# Patient Record
Sex: FEMALE | Race: WHITE | Employment: FULL TIME | URBAN - METROPOLITAN AREA
[De-identification: names, ages, dates, MRNs, and addresses within clinical notes are randomized per-mention and may not be internally consistent; named-entity substitution may affect disease eponyms.]

---

## 2017-11-10 ENCOUNTER — HOSPITAL ENCOUNTER (EMERGENCY)
Age: 57
Discharge: HOME | End: 2017-11-10
Attending: EMERGENCY MEDICINE
Payer: SELF-PAY

## 2017-11-10 ENCOUNTER — APPOINTMENT (OUTPATIENT)
Dept: GENERAL RADIOLOGY | Age: 57
End: 2017-11-10
Attending: EMERGENCY MEDICINE
Payer: SELF-PAY

## 2017-11-10 VITALS
RESPIRATION RATE: 16 BRPM | TEMPERATURE: 97.7 F | OXYGEN SATURATION: 95 % | SYSTOLIC BLOOD PRESSURE: 130 MMHG | HEART RATE: 57 BPM | DIASTOLIC BLOOD PRESSURE: 82 MMHG

## 2017-11-10 DIAGNOSIS — M25.561 ACUTE PAIN OF RIGHT KNEE: Primary | ICD-10-CM

## 2017-11-10 LAB
INR PPP: 1.1 (ref 0.9–1.2)
PROTHROMBIN TIME: 12.1 SEC (ref 9.6–12)

## 2017-11-10 PROCEDURE — 73562 X-RAY EXAM OF KNEE 3: CPT

## 2017-11-10 PROCEDURE — 99282 EMERGENCY DEPT VISIT SF MDM: CPT | Performed by: EMERGENCY MEDICINE

## 2017-11-10 PROCEDURE — 85610 PROTHROMBIN TIME: CPT | Performed by: EMERGENCY MEDICINE

## 2017-11-10 NOTE — ED NOTES
I have reviewed discharge instructions with the patient. The patient verbalized understanding. Patient left ED via Discharge Method: wheelchair to Home with family. Opportunity for questions and clarification provided. Patient given 0 scripts.

## 2017-11-10 NOTE — DISCHARGE INSTRUCTIONS
Knee Pain or Injury: Care Instructions  Your Care Instructions    Injuries are a common cause of knee problems. Sudden (acute) injuries may be caused by a direct blow to the knee. They can also be caused by abnormal twisting, bending, or falling on the knee. Pain, bruising, or swelling may be severe, and may start within minutes of the injury. Overuse is another cause of knee pain. Other causes are climbing stairs, kneeling, and other activities that use the knee. Everyday wear and tear, especially as you get older, also can cause knee pain. Rest, along with home treatment, often relieves pain and allows your knee to heal. If you have a serious knee injury, you may need tests and treatment. Follow-up care is a key part of your treatment and safety. Be sure to make and go to all appointments, and call your doctor if you are having problems. It's also a good idea to know your test results and keep a list of the medicines you take. How can you care for yourself at home? · Be safe with medicines. Read and follow all instructions on the label. ¨ If the doctor gave you a prescription medicine for pain, take it as prescribed. ¨ If you are not taking a prescription pain medicine, ask your doctor if you can take an over-the-counter medicine. · Rest and protect your knee. Take a break from any activity that may cause pain. · Put ice or a cold pack on your knee for 10 to 20 minutes at a time. Put a thin cloth between the ice and your skin. · Prop up a sore knee on a pillow when you ice it or anytime you sit or lie down for the next 3 days. Try to keep it above the level of your heart. This will help reduce swelling. · If your knee is not swollen, you can put moist heat, a heating pad, or a warm cloth on your knee. · If your doctor recommends an elastic bandage, sleeve, or other type of support for your knee, wear it as directed.   · Follow your doctor's instructions about how much weight you can put on your leg. Use a cane, crutches, or a walker as instructed. · Follow your doctor's instructions about activity during your healing process. If you can do mild exercise, slowly increase your activity. · Reach and stay at a healthy weight. Extra weight can strain the joints, especially the knees and hips, and make the pain worse. Losing even a few pounds may help. When should you call for help? Call 911 anytime you think you may need emergency care. For example, call if:  ? · You have symptoms of a blood clot in your lung (called a pulmonary embolism). These may include:  ¨ Sudden chest pain. ¨ Trouble breathing. ¨ Coughing up blood. ?Call your doctor now or seek immediate medical care if:  ? · You have severe or increasing pain. ? · Your leg or foot turns cold or changes color. ? · You cannot stand or put weight on your knee. ? · Your knee looks twisted or bent out of shape. ? · You cannot move your knee. ? · You have signs of infection, such as:  ¨ Increased pain, swelling, warmth, or redness. ¨ Red streaks leading from the knee. ¨ Pus draining from a place on your knee. ¨ A fever. ? · You have signs of a blood clot in your leg (called a deep vein thrombosis), such as:  ¨ Pain in your calf, back of the knee, thigh, or groin. ¨ Redness and swelling in your leg or groin. ? Watch closely for changes in your health, and be sure to contact your doctor if:  ? · You have tingling, weakness, or numbness in your knee. ? · You have any new symptoms, such as swelling. ? · You have bruises from a knee injury that last longer than 2 weeks. ? · You do not get better as expected. Where can you learn more? Go to http://edilia-jadiel.info/. Enter K195 in the search box to learn more about \"Knee Pain or Injury: Care Instructions. \"  Current as of: March 20, 2017  Content Version: 11.4  © 7603-0714 [employer].  Care instructions adapted under license by [employer] (which disclaims liability or warranty for this information). If you have questions about a medical condition or this instruction, always ask your healthcare professional. [address] any warranty or liability for your use of this information.

## 2017-11-10 NOTE — ED TRIAGE NOTES
Pt fell at home prior to coming to work today. 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

## 2017-11-10 NOTE — ED PROVIDER NOTES
[employer]                        Emergency Department     Chief Complaint:  Right knee pain  HPI:  Patient fell this morning tripped over the covers landing directly on her right knee she did not hit her head has no other complaints she does take Coumadin for past history of blood clots. Mechanism of injury ttrip and fall  Pain was first noticed this morning  Severity of pain is moderate improving  Associated symptoms include pain with palpation or ambulation  Onset of symptoms was sudden  Historian:   patient  Review of Systems:  Include pertinent positives and negatives. CONST:          Denies: fever  MUSC: Right knee pain no other injuries\  Head:  nontraumatic  SKIN:  Denies: rash  Past Medical History:  No past medical history on file. No past surgical history on file. Social History   Substance Use Topics    Smoking status: Not on file    Smokeless tobacco: Not on file    Alcohol use Not on file     No family history on file. Previous Medications    No medications on file     Allergies as of 11/10/2017 - Review Complete 11/10/2017   Allergen Reaction Noted    Penicillins Rash 11/10/2017     . immunhx    Physical Exam:      General Appear: alert, no distress  Musculoskeletal:  The patient has knee swelling and pain anteriorly with palpation. No instability noted. Pulses:  ppulses and cap refill intact distally to injury.   Skin:   no rash  Neurologic:  no sensory or motor deficit  _______________________________________________________________________  Radiology studies performed:    XR KNEE RT 3 V   Final Result   IMPRESSION: No acute bony abnormality          _______________________________________________________________________  Progress:    Patient feeling better on reevaluation    Nursing notes were reviewed:  yes  Old medical records: obtained and reviewed:  No  Discussed patient with another provider: No  _______________________________________________________________________  Assessment/Plan:    Patient's x-ray is normal she'll follow up with orthopedics if symptoms do not improve.    _______________________________________________________________________  Condition:  Good  Disposition:  Home  Diagnosis:  Knee injury      MARIA TERESA Trejo; version 2.0; revised April, 2016

## 2018-01-31 PROBLEM — I82.409 DVT (DEEP VENOUS THROMBOSIS) (HCC): Status: ACTIVE | Noted: 2017-06-01

## 2018-01-31 PROBLEM — I26.99 PULMONARY EMBOLI (HCC): Status: ACTIVE | Noted: 2017-06-01

## 2018-02-12 PROBLEM — Z80.3 FAMILY HISTORY OF BREAST CANCER IN MOTHER: Status: ACTIVE | Noted: 2018-02-12

## 2018-02-12 PROBLEM — Z12.11 COLON CANCER SCREENING: Status: ACTIVE | Noted: 2018-02-12

## 2018-02-12 PROBLEM — E78.00 ELEVATED CHOLESTEROL: Status: ACTIVE | Noted: 2018-02-12

## 2018-02-12 PROBLEM — R63.5 WEIGHT GAIN: Status: ACTIVE | Noted: 2018-02-12

## 2018-02-12 PROBLEM — R00.2 PALPITATIONS: Status: ACTIVE | Noted: 2018-02-12

## 2018-02-12 PROBLEM — G47.39 OTHER SLEEP APNEA: Status: ACTIVE | Noted: 2018-02-12

## 2018-02-12 PROBLEM — R06.02 SOB (SHORTNESS OF BREATH): Status: ACTIVE | Noted: 2018-02-12

## 2018-02-19 ENCOUNTER — HOSPITAL ENCOUNTER (OUTPATIENT)
Dept: MAMMOGRAPHY | Age: 58
Discharge: HOME | End: 2018-02-19
Attending: INTERNAL MEDICINE
Payer: COMMERCIAL

## 2018-02-19 DIAGNOSIS — Z80.3 FAMILY HISTORY OF BREAST CANCER IN MOTHER: ICD-10-CM

## 2018-02-19 PROCEDURE — 77067 SCR MAMMO BI INCL CAD: CPT

## 2018-02-21 ENCOUNTER — HOSPITAL ENCOUNTER (OUTPATIENT)
Dept: ULTRASOUND IMAGING | Age: 58
Discharge: HOME | End: 2018-02-21
Attending: INTERNAL MEDICINE
Payer: COMMERCIAL

## 2018-02-21 ENCOUNTER — HOSPITAL ENCOUNTER (OUTPATIENT)
Dept: CT IMAGING | Age: 58
Discharge: HOME | End: 2018-02-21
Attending: INTERNAL MEDICINE
Payer: COMMERCIAL

## 2018-02-21 DIAGNOSIS — I82.493 DEEP VEIN THROMBOSIS (DVT) OF OTHER VEIN OF BOTH LOWER EXTREMITIES, UNSPECIFIED CHRONICITY (HCC): ICD-10-CM

## 2018-02-21 DIAGNOSIS — R06.02 SOB (SHORTNESS OF BREATH): ICD-10-CM

## 2018-02-21 DIAGNOSIS — Z86.711 HISTORY OF PULMONARY EMBOLISM: ICD-10-CM

## 2018-02-21 LAB — CREAT BLD-MCNC: 0.9 MG/DL (ref 0.8–1.5)

## 2018-02-21 PROCEDURE — 74011000258 HC RX REV CODE- 258: Performed by: INTERNAL MEDICINE

## 2018-02-21 PROCEDURE — 74011636320 HC RX REV CODE- 636/320: Performed by: INTERNAL MEDICINE

## 2018-02-21 PROCEDURE — 93970 EXTREMITY STUDY: CPT

## 2018-02-21 PROCEDURE — 71260 CT THORAX DX C+: CPT

## 2018-02-21 PROCEDURE — 82565 ASSAY OF CREATININE: CPT

## 2018-02-21 RX ORDER — SODIUM CHLORIDE 0.9 % (FLUSH) 0.9 %
10 SYRINGE (ML) INJECTION
Status: COMPLETED | OUTPATIENT
Start: 2018-02-21 | End: 2018-02-21

## 2018-02-21 RX ADMIN — IOPAMIDOL 100 ML: 755 INJECTION, SOLUTION INTRAVENOUS at 10:05

## 2018-02-21 RX ADMIN — Medication 10 ML: at 10:06

## 2018-02-21 RX ADMIN — SODIUM CHLORIDE 100 ML: 900 INJECTION, SOLUTION INTRAVENOUS at 10:05

## 2018-03-20 ENCOUNTER — HOSPITAL ENCOUNTER (OUTPATIENT)
Dept: ULTRASOUND IMAGING | Age: 58
Discharge: HOME | End: 2018-03-20
Attending: INTERNAL MEDICINE
Payer: COMMERCIAL

## 2018-03-20 DIAGNOSIS — E04.1 THYROID NODULE: ICD-10-CM

## 2018-03-20 PROCEDURE — 76536 US EXAM OF HEAD AND NECK: CPT

## 2018-05-01 PROBLEM — Z79.01 LONG TERM (CURRENT) USE OF ANTICOAGULANTS: Status: ACTIVE | Noted: 2018-05-01

## 2018-05-01 PROBLEM — I26.99 PULMONARY EMBOLUS (HCC): Status: ACTIVE | Noted: 2018-05-01

## 2018-05-14 PROBLEM — R06.02 SOB (SHORTNESS OF BREATH): Status: RESOLVED | Noted: 2018-02-12 | Resolved: 2018-05-14

## 2018-05-14 PROBLEM — R06.81 APNEA: Status: ACTIVE | Noted: 2018-05-14

## 2018-05-14 PROBLEM — Z79.01 LONG TERM (CURRENT) USE OF ANTICOAGULANTS: Status: RESOLVED | Noted: 2018-05-01 | Resolved: 2018-05-14

## 2018-05-14 PROBLEM — D68.52 PROTHROMBIN GENE MUTATION (HCC): Status: ACTIVE | Noted: 2018-05-14

## 2018-05-14 PROBLEM — R63.5 WEIGHT GAIN: Status: RESOLVED | Noted: 2018-02-12 | Resolved: 2018-05-14

## 2018-05-14 PROBLEM — I26.99 PULMONARY EMBOLUS (HCC): Status: RESOLVED | Noted: 2018-05-01 | Resolved: 2018-05-14

## 2018-05-14 PROBLEM — E04.1 THYROID NODULE: Status: ACTIVE | Noted: 2018-05-14

## 2018-05-14 PROBLEM — I82.409 DVT (DEEP VENOUS THROMBOSIS) (HCC): Status: RESOLVED | Noted: 2017-06-01 | Resolved: 2018-05-14

## 2018-05-14 PROBLEM — I49.3 PVC (PREMATURE VENTRICULAR CONTRACTION): Status: ACTIVE | Noted: 2018-05-14

## 2018-05-14 PROBLEM — E78.00 ELEVATED CHOLESTEROL: Status: RESOLVED | Noted: 2018-02-12 | Resolved: 2018-05-14

## 2018-05-14 PROBLEM — E66.01 OBESITY, MORBID (HCC): Status: ACTIVE | Noted: 2018-05-14

## 2018-05-14 PROBLEM — R00.2 PALPITATIONS: Status: RESOLVED | Noted: 2018-02-12 | Resolved: 2018-05-14

## 2018-05-14 PROBLEM — Z12.11 COLON CANCER SCREENING: Status: RESOLVED | Noted: 2018-02-12 | Resolved: 2018-05-14

## 2018-06-05 ENCOUNTER — HOSPITAL ENCOUNTER (OUTPATIENT)
Dept: ULTRASOUND IMAGING | Age: 58
Discharge: HOME | End: 2018-06-05
Attending: INTERNAL MEDICINE
Payer: COMMERCIAL

## 2018-06-05 DIAGNOSIS — M79.89 LEG SWELLING: ICD-10-CM

## 2018-06-05 PROCEDURE — 93971 EXTREMITY STUDY: CPT

## 2018-09-17 PROBLEM — R73.01 IMPAIRED FASTING BLOOD SUGAR: Status: ACTIVE | Noted: 2018-09-17

## 2018-09-17 PROBLEM — R06.81 APNEA: Status: RESOLVED | Noted: 2018-05-14 | Resolved: 2018-09-17

## 2018-10-18 ENCOUNTER — HOSPITAL ENCOUNTER (OUTPATIENT)
Dept: OCCUPATIONAL MEDICINE | Age: 58
Discharge: HOME | End: 2018-10-18

## 2018-10-18 DIAGNOSIS — T14.90XA INJURY: ICD-10-CM

## 2019-02-05 PROBLEM — F41.9 ANXIETY: Status: ACTIVE | Noted: 2019-02-05

## 2019-02-15 PROBLEM — E04.2 MULTIPLE THYROID NODULES: Status: ACTIVE | Noted: 2018-05-14

## 2019-08-14 ENCOUNTER — HOSPITAL ENCOUNTER (OUTPATIENT)
Dept: LAB | Age: 59
Discharge: HOME | End: 2019-08-14
Payer: COMMERCIAL

## 2019-08-14 ENCOUNTER — HOSPITAL ENCOUNTER (OUTPATIENT)
Dept: MAMMOGRAPHY | Age: 59
Discharge: HOME | End: 2019-08-14
Attending: INTERNAL MEDICINE
Payer: COMMERCIAL

## 2019-08-14 DIAGNOSIS — D68.52 PROTHROMBIN GENE MUTATION (HCC): ICD-10-CM

## 2019-08-14 DIAGNOSIS — Z12.39 BREAST SCREENING, UNSPECIFIED: ICD-10-CM

## 2019-08-14 DIAGNOSIS — Z12.31 VISIT FOR SCREENING MAMMOGRAM: ICD-10-CM

## 2019-08-14 LAB
D DIMER PPP FEU-MCNC: <0.27 UG/ML(FEU)
INR PPP: 1.8
PROTHROMBIN TIME: 21.6 SEC (ref 11.7–14.5)

## 2019-08-14 PROCEDURE — 85379 FIBRIN DEGRADATION QUANT: CPT

## 2019-08-14 PROCEDURE — 77063 BREAST TOMOSYNTHESIS BI: CPT

## 2019-08-14 PROCEDURE — 85610 PROTHROMBIN TIME: CPT

## 2019-08-14 PROCEDURE — 36415 COLL VENOUS BLD VENIPUNCTURE: CPT

## 2019-11-27 ENCOUNTER — HOSPITAL ENCOUNTER (OUTPATIENT)
Dept: LAB | Age: 59
Discharge: HOME | End: 2019-11-27
Payer: COMMERCIAL

## 2019-11-27 DIAGNOSIS — R06.02 SOB (SHORTNESS OF BREATH): ICD-10-CM

## 2019-11-27 DIAGNOSIS — I26.99 OTHER PULMONARY EMBOLISM WITHOUT ACUTE COR PULMONALE, UNSPECIFIED CHRONICITY (HCC): ICD-10-CM

## 2019-11-27 LAB — D DIMER PPP FEU-MCNC: 0.31 UG/ML(FEU)

## 2019-11-27 PROCEDURE — 85379 FIBRIN DEGRADATION QUANT: CPT

## 2019-11-27 PROCEDURE — 36415 COLL VENOUS BLD VENIPUNCTURE: CPT

## 2019-11-28 PROBLEM — Z91.89 AT RISK FOR OBSTRUCTIVE SLEEP APNEA: Status: ACTIVE | Noted: 2019-11-28

## 2021-03-22 ENCOUNTER — TRANSCRIBE ORDER (OUTPATIENT)
Dept: SCHEDULING | Age: 61
End: 2021-03-22

## 2021-03-22 DIAGNOSIS — Z12.31 SCREENING MAMMOGRAM, ENCOUNTER FOR: Primary | ICD-10-CM
